# Patient Record
(demographics unavailable — no encounter records)

---

## 2024-11-22 NOTE — PHYSICAL EXAM
[Intact] : all reflexes within normal limits bilaterally [Straight-Leg Raise Test - Left] : straight leg raise of the left leg was negative [Straight-Leg Raise Test - Right] : straight leg raise  of the right leg was negative

## 2024-11-22 NOTE — ASSESSMENT
[FreeTextEntry1] : 65 y/o M s/p L4-L5 laminectomy posterior fusion with instrumentation with improvement in symptoms ****NOTE IS INCOMPLETE, CHART PREP, WILL BE COMPLETED ONCE PATIENT IS SEEN/EVALUATED****

## 2024-11-22 NOTE — HISTORY OF PRESENT ILLNESS
[FreeTextEntry1] : 11/21/24: 66-year-old male s/p L4-L5 laminectomy posterior fusion with instrumentation on 7/22/24 with improvement. Here for follow up with x-rays.  He did great. Had no significant pain, numbness or tingling. No bowel or bladder incontinence.   8/12/24:  Pt arrives for an initial post op visit s/p 7/22/24 L4-L5 laminectomy posterior fusion with instrumentation.  Feeling well, denies pain since surgery.  Incisions well healed x 3, steri strips intact on lower Right gluteal incision. Heh as no significant pain, numbness or tingling. He has no bowel or bladder incontinence.   6/13/24: 66-year-old male with history of chronic LBP, continues to have worsening lower back pain radiating to right lower extremity. He had a recent accident and his pain is worse since the incident. Pain is constant and affecting quality of his life 9/10 in severity. Has numbness and tingling as well. He has been seen by pain management Dr. Harrell who performed EPSI which did not improve pain. He also had L4-5, L5-S1 percutaneous discectomy 02/2024 with worsening symptoms. Here with updated imaging studies and to discuss surgery since he has failed conservative therapy.  Denies bowel/bladder dysfunction.   11/28/23: Mr. Ferreira is a 66 y/o, M, with a hx of DM, HTN, and chronic LBP here to establish care as a new patient. He reports his pain is an 8/10 and radiates down LE. He has numbness and tingling and LE, mostly calves when walking. He has been seen by pain management Dr. Harrell who performed EPSI which did not improve pain. Previous MRI lumbar spine 09/22 with significant spondylosis, stenosis, and spondylolisthesis. Denies bowel/bladder dysfunction.

## 2024-11-22 NOTE — HISTORY OF PRESENT ILLNESS
[FreeTextEntry1] : 11/21/24: 66-year-old male s/p L4-L5 laminectomy posterior fusion with instrumentation on 7/22/24 with improvement. Here for follow up with x-rays.  He did great. Had no significant pain, numbness or tingling. No bowel or bladder incontinence.   8/12/24:  Pt arrives for an initial post op visit s/p 7/22/24 L4-L5 laminectomy posterior fusion with instrumentation.  Feeling well, denies pain since surgery.  Incisions well healed x 3, steri strips intact on lower Right gluteal incision. Heh as no significant pain, numbness or tingling. He has no bowel or bladder incontinence.   6/13/24: 66-year-old male with history of chronic LBP, continues to have worsening lower back pain radiating to right lower extremity. He had a recent accident and his pain is worse since the incident. Pain is constant and affecting quality of his life 9/10 in severity. Has numbness and tingling as well. He has been seen by pain management Dr. Harrell who performed EPSI which did not improve pain. He also had L4-5, L5-S1 percutaneous discectomy 02/2024 with worsening symptoms. Here with updated imaging studies and to discuss surgery since he has failed conservative therapy.  Denies bowel/bladder dysfunction.   11/28/23: Mr. Ferreira is a 64 y/o, M, with a hx of DM, HTN, and chronic LBP here to establish care as a new patient. He reports his pain is an 8/10 and radiates down LE. He has numbness and tingling and LE, mostly calves when walking. He has been seen by pain management Dr. Harrell who performed EPSI which did not improve pain. Previous MRI lumbar spine 09/22 with significant spondylosis, stenosis, and spondylolisthesis. Denies bowel/bladder dysfunction.

## 2024-11-22 NOTE — ASSESSMENT
[FreeTextEntry1] : 67 y/o M s/p L4-L5 laminectomy posterior fusion with instrumentation with improvement in symptoms ****NOTE IS INCOMPLETE, CHART PREP, WILL BE COMPLETED ONCE PATIENT IS SEEN/EVALUATED****